# Patient Record
Sex: FEMALE | Race: WHITE | NOT HISPANIC OR LATINO
[De-identification: names, ages, dates, MRNs, and addresses within clinical notes are randomized per-mention and may not be internally consistent; named-entity substitution may affect disease eponyms.]

---

## 2023-01-01 ENCOUNTER — NON-APPOINTMENT (OUTPATIENT)
Age: 73
End: 2023-01-01

## 2023-07-28 PROBLEM — Z00.00 ENCOUNTER FOR PREVENTIVE HEALTH EXAMINATION: Status: ACTIVE | Noted: 2023-07-28

## 2023-08-22 ENCOUNTER — RX ONLY (RX ONLY)
Age: 73
End: 2023-08-22

## 2023-08-22 ENCOUNTER — OFFICE (OUTPATIENT)
Dept: URBAN - METROPOLITAN AREA CLINIC 122 | Facility: CLINIC | Age: 73
Setting detail: OPHTHALMOLOGY
End: 2023-08-22
Payer: MEDICARE

## 2023-08-22 DIAGNOSIS — H02.822: ICD-10-CM

## 2023-08-22 DIAGNOSIS — H43.812: ICD-10-CM

## 2023-08-22 DIAGNOSIS — H40.033: ICD-10-CM

## 2023-08-22 DIAGNOSIS — H16.223: ICD-10-CM

## 2023-08-22 DIAGNOSIS — H25.13: ICD-10-CM

## 2023-08-22 PROCEDURE — 92020 GONIOSCOPY: CPT | Performed by: OPHTHALMOLOGY

## 2023-08-22 PROCEDURE — 99214 OFFICE O/P EST MOD 30 MIN: CPT | Performed by: OPHTHALMOLOGY

## 2023-08-22 ASSESSMENT — SPHEQUIV_DERIVED
OS_SPHEQUIV: 0.125
OD_SPHEQUIV: -0.25
OS_SPHEQUIV: -0.375
OD_SPHEQUIV: -0.875
OS_SPHEQUIV: -0.375
OD_SPHEQUIV: -0.875

## 2023-08-22 ASSESSMENT — REFRACTION_CURRENTRX
OD_OVR_VA: 20/
OD_CYLINDER: -1.25
OS_SPHERE: +1.00
OD_CYLINDER: -1.50
OD_AXIS: 91
OS_AXIS: 96
OS_CYLINDER: -2.00
OD_SPHERE: +2.50
OS_SPHERE: +3.00
OD_AXIS: 97
OD_OVR_VA: 20/
OD_SPHERE: +0.25
OS_OVR_VA: 20/
OS_CYLINDER: -2.00
OS_OVR_VA: 20/
OS_AXIS: 96

## 2023-08-22 ASSESSMENT — CONFRONTATIONAL VISUAL FIELD TEST (CVF)
OD_FINDINGS: FULL
OS_FINDINGS: FULL

## 2023-08-22 ASSESSMENT — REFRACTION_AUTOREFRACTION
OD_AXIS: 98
OD_SPHERE: +0.75
OS_AXIS: 92
OS_CYLINDER: -2.25
OS_SPHERE: +1.25
OD_CYLINDER: -2.00

## 2023-08-22 ASSESSMENT — REFRACTION_MANIFEST
OU_VA: 20/25
OS_AXIS: 5
OS_SPHERE: -1.25
OD_AXIS: 5
OU_VA: 20/25
OD_SPHERE: -1.75
OD_VA1: 20/25
OS_AXIS: 5
OS_CYLINDER: +1.75
OD_CYLINDER: +1.75
OS_SPHERE: -1.25
OD_AXIS: 5
OS_CYLINDER: +1.75
OD_SPHERE: -1.75
OS_VA1: 20/25
OD_CYLINDER: +1.75
OS_VA1: 20/25
OD_VA1: 20/25

## 2023-08-22 ASSESSMENT — TONOMETRY
OS_IOP_MMHG: 14
OD_IOP_MMHG: 14

## 2023-08-22 ASSESSMENT — TEAR BREAK UP TIME (TBUT)
OD_TBUT: 2+
OS_TBUT: 2+

## 2023-08-22 ASSESSMENT — VISUAL ACUITY
OD_BCVA: 20/30
OS_BCVA: 20/40

## 2023-08-23 ENCOUNTER — APPOINTMENT (OUTPATIENT)
Dept: HEART AND VASCULAR | Facility: CLINIC | Age: 73
End: 2023-08-23
Payer: MEDICARE

## 2023-08-23 ENCOUNTER — NON-APPOINTMENT (OUTPATIENT)
Age: 73
End: 2023-08-23

## 2023-08-23 ENCOUNTER — TRANSCRIPTION ENCOUNTER (OUTPATIENT)
Age: 73
End: 2023-08-23

## 2023-08-23 VITALS
OXYGEN SATURATION: 98 % | WEIGHT: 155 LBS | HEART RATE: 60 BPM | HEIGHT: 66.5 IN | DIASTOLIC BLOOD PRESSURE: 68 MMHG | RESPIRATION RATE: 16 BRPM | SYSTOLIC BLOOD PRESSURE: 139 MMHG | BODY MASS INDEX: 24.62 KG/M2 | TEMPERATURE: 97.3 F

## 2023-08-23 DIAGNOSIS — R07.89 OTHER CHEST PAIN: ICD-10-CM

## 2023-08-23 DIAGNOSIS — Z86.39 PERSONAL HISTORY OF OTHER ENDOCRINE, NUTRITIONAL AND METABOLIC DISEASE: ICD-10-CM

## 2023-08-23 DIAGNOSIS — Z78.9 OTHER SPECIFIED HEALTH STATUS: ICD-10-CM

## 2023-08-23 DIAGNOSIS — Z87.891 PERSONAL HISTORY OF NICOTINE DEPENDENCE: ICD-10-CM

## 2023-08-23 PROCEDURE — 99202 OFFICE O/P NEW SF 15 MIN: CPT

## 2023-08-23 PROCEDURE — 93000 ELECTROCARDIOGRAM COMPLETE: CPT

## 2023-08-24 NOTE — ASSESSMENT
[FreeTextEntry1] : 72 yo female with h/o HTN, HLD and extensive FMH of cardiac disease here to establish care for worsening chest tightness and dyspnea on exertion over the past several months  EKG today sinus bradycardia with Twis in III, aVF #chest tightness/dyspnea --prior stress tests were normal per patient --will obtain CCTA to evaluate for CAD --TTE pending --carotid duplex  #Hypertension --BP slightly elevated today; reports increased stress of late and elevated BP during office visits --continue losartan for now; will continue to monitor #Hyperlipidemia --continue crestor 5mg for now --Last LDL 88, will continue to monitor  will f/u after above testing

## 2023-08-24 NOTE — HISTORY OF PRESENT ILLNESS
[FreeTextEntry1] : 74 yo female with h/o HTN, HLD and extensive FMH of cardiac disease here to establish care for worsening chest tightness. Reports symptoms with exertion, especially worse this past winter when she notes that she had episodes during strenuous activities such as walking up a hill. Associated with dyspnea on exertion. Denies any palpitations, LE edema, orthopnea, dizziness/lightheadedness. 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 Reports prior cardiac workup which included stress testing has been negative. Last one about 3-4 years ago (no records available to me) +FMH of premature CAD - father had MIs in his 50s, mother had MI later on in 60-70s  Former smoker; quit 15-16 years ago  Labs 11/2022: --Lipid panel Tchol 174/HDL 66, LDL 88, TG 98 Creat 0.7

## 2023-08-28 ENCOUNTER — NON-APPOINTMENT (OUTPATIENT)
Age: 73
End: 2023-08-28

## 2023-09-12 ENCOUNTER — RESULT REVIEW (OUTPATIENT)
Age: 73
End: 2023-09-12

## 2023-09-12 ENCOUNTER — OFFICE (OUTPATIENT)
Dept: URBAN - METROPOLITAN AREA CLINIC 122 | Facility: CLINIC | Age: 73
Setting detail: OPHTHALMOLOGY
End: 2023-09-12
Payer: MEDICARE

## 2023-09-12 DIAGNOSIS — H02.822: ICD-10-CM

## 2023-09-12 PROCEDURE — 92285 EXTERNAL OCULAR PHOTOGRAPHY: CPT | Performed by: OPHTHALMOLOGY

## 2023-09-12 PROCEDURE — 67840 REMOVE EYELID LESION: CPT | Performed by: OPHTHALMOLOGY

## 2023-09-12 ASSESSMENT — REFRACTION_MANIFEST
OD_AXIS: 5
OS_CYLINDER: +1.75
OS_AXIS: 5
OD_VA1: 20/25
OD_SPHERE: -1.75
OS_AXIS: 5
OS_VA1: 20/25
OS_SPHERE: -1.25
OS_SPHERE: -1.25
OD_CYLINDER: +1.75
OU_VA: 20/25
OU_VA: 20/25
OD_AXIS: 5
OD_CYLINDER: +1.75
OS_CYLINDER: +1.75
OS_VA1: 20/25
OD_VA1: 20/25
OD_SPHERE: -1.75

## 2023-09-12 ASSESSMENT — VISUAL ACUITY
OD_BCVA: 20/30
OS_BCVA: 20/30

## 2023-09-12 ASSESSMENT — REFRACTION_CURRENTRX
OD_AXIS: 91
OS_CYLINDER: -2.00
OD_AXIS: 97
OS_OVR_VA: 20/
OS_OVR_VA: 20/
OS_SPHERE: +3.00
OD_OVR_VA: 20/
OS_CYLINDER: -2.00
OD_SPHERE: +2.50
OS_AXIS: 96
OS_AXIS: 96
OD_CYLINDER: -1.25
OD_OVR_VA: 20/
OS_SPHERE: +1.00
OD_SPHERE: +0.25
OD_CYLINDER: -1.50

## 2023-09-12 ASSESSMENT — SPHEQUIV_DERIVED
OS_SPHEQUIV: 0.125
OS_SPHEQUIV: -0.375
OD_SPHEQUIV: -0.875
OS_SPHEQUIV: -0.375
OD_SPHEQUIV: -0.875
OD_SPHEQUIV: -0.25

## 2023-09-12 ASSESSMENT — REFRACTION_AUTOREFRACTION
OD_SPHERE: +0.75
OS_SPHERE: +1.25
OD_AXIS: 98
OS_AXIS: 92
OS_CYLINDER: -2.25
OD_CYLINDER: -2.00

## 2023-09-12 ASSESSMENT — TONOMETRY
OS_IOP_MMHG: 16
OD_IOP_MMHG: 15

## 2023-09-15 ENCOUNTER — NON-APPOINTMENT (OUTPATIENT)
Age: 73
End: 2023-09-15

## 2023-09-15 LAB
ANION GAP SERPL CALC-SCNC: 9 MMOL/L
BUN SERPL-MCNC: 18 MG/DL
CALCIUM SERPL-MCNC: 9.4 MG/DL
CHLORIDE SERPL-SCNC: 101 MMOL/L
CO2 SERPL-SCNC: 27 MMOL/L
CREAT SERPL-MCNC: 0.77 MG/DL
EGFR: 81 ML/MIN/1.73M2
GLUCOSE SERPL-MCNC: 83 MG/DL
POTASSIUM SERPL-SCNC: 4.3 MMOL/L
SODIUM SERPL-SCNC: 137 MMOL/L

## 2023-09-18 ENCOUNTER — RESULT REVIEW (OUTPATIENT)
Age: 73
End: 2023-09-18

## 2023-09-19 ENCOUNTER — NON-APPOINTMENT (OUTPATIENT)
Age: 73
End: 2023-09-19

## 2023-10-11 ENCOUNTER — APPOINTMENT (OUTPATIENT)
Dept: PULMONOLOGY | Facility: CLINIC | Age: 73
End: 2023-10-11
Payer: MEDICARE

## 2023-10-11 VITALS
SYSTOLIC BLOOD PRESSURE: 130 MMHG | WEIGHT: 156 LBS | DIASTOLIC BLOOD PRESSURE: 74 MMHG | HEART RATE: 60 BPM | HEIGHT: 66.5 IN | BODY MASS INDEX: 24.78 KG/M2 | OXYGEN SATURATION: 98 %

## 2023-10-11 DIAGNOSIS — R93.89 ABNORMAL FINDINGS ON DIAGNOSTIC IMAGING OF OTHER SPECIFIED BODY STRUCTURES: ICD-10-CM

## 2023-10-11 PROCEDURE — 99202 OFFICE O/P NEW SF 15 MIN: CPT

## 2023-10-11 RX ORDER — METOPROLOL SUCCINATE 50 MG/1
50 TABLET, EXTENDED RELEASE ORAL
Qty: 2 | Refills: 0 | Status: DISCONTINUED | COMMUNITY
Start: 2023-08-23 | End: 2023-10-11

## 2023-10-25 ENCOUNTER — NON-APPOINTMENT (OUTPATIENT)
Age: 73
End: 2023-10-25

## 2023-10-25 ENCOUNTER — APPOINTMENT (OUTPATIENT)
Dept: HEART AND VASCULAR | Facility: CLINIC | Age: 73
End: 2023-10-25
Payer: MEDICARE

## 2023-10-25 PROCEDURE — 93306 TTE W/DOPPLER COMPLETE: CPT

## 2023-10-25 PROCEDURE — 93880 EXTRACRANIAL BILAT STUDY: CPT

## 2023-11-06 ENCOUNTER — NON-APPOINTMENT (OUTPATIENT)
Age: 73
End: 2023-11-06

## 2023-11-08 ENCOUNTER — APPOINTMENT (OUTPATIENT)
Dept: PULMONOLOGY | Facility: CLINIC | Age: 73
End: 2023-11-08
Payer: MEDICARE

## 2023-11-08 VITALS
BODY MASS INDEX: 24.46 KG/M2 | HEIGHT: 66.5 IN | OXYGEN SATURATION: 98 % | WEIGHT: 154 LBS | DIASTOLIC BLOOD PRESSURE: 70 MMHG | SYSTOLIC BLOOD PRESSURE: 120 MMHG | HEART RATE: 78 BPM | RESPIRATION RATE: 16 BRPM

## 2023-11-08 DIAGNOSIS — N95.1 MENOPAUSAL AND FEMALE CLIMACTERIC STATES: ICD-10-CM

## 2023-11-08 PROCEDURE — 99213 OFFICE O/P EST LOW 20 MIN: CPT

## 2023-11-08 RX ORDER — ROSUVASTATIN CALCIUM 5 MG/1
5 TABLET, FILM COATED ORAL
Refills: 0 | Status: ACTIVE | COMMUNITY

## 2023-11-08 RX ORDER — LOSARTAN POTASSIUM AND HYDROCHLOROTHIAZIDE 12.5; 1 MG/1; MG/1
100-12.5 TABLET ORAL DAILY
Refills: 0 | Status: ACTIVE | COMMUNITY

## 2023-11-11 ENCOUNTER — NON-APPOINTMENT (OUTPATIENT)
Age: 73
End: 2023-11-11

## 2023-12-29 ENCOUNTER — NON-APPOINTMENT (OUTPATIENT)
Age: 73
End: 2023-12-29

## 2024-09-13 ENCOUNTER — APPOINTMENT (OUTPATIENT)
Dept: ORTHOPEDIC SURGERY | Facility: CLINIC | Age: 74
End: 2024-09-13
Payer: MEDICARE

## 2024-09-13 VITALS — HEIGHT: 66.5 IN | WEIGHT: 154 LBS | BODY MASS INDEX: 24.46 KG/M2

## 2024-09-13 DIAGNOSIS — M16.11 UNILATERAL PRIMARY OSTEOARTHRITIS, RIGHT HIP: ICD-10-CM

## 2024-09-13 PROCEDURE — 99204 OFFICE O/P NEW MOD 45 MIN: CPT | Mod: 57

## 2024-09-13 NOTE — PHYSICAL EXAM
[de-identified] : painful restricted rom right hip nvid nl gait skin intact [de-identified] : hip xrays shows severe arthritis with bone on bone contact, subchondral sclerosis and osteophytes and cysts right

## 2024-09-13 NOTE — HISTORY OF PRESENT ILLNESS
[de-identified] : Patient comes in with more than 6 months of right hip pain atraumatic in onset.  Patient has tried greater than 6 months of nsaids, physical therapy and doctor directed exercises and injections.  Patient continues to have pain that is 8/10 in severity and interferes with activities of daily living such as putting on shoes and socks, walking two blocks, and getting up and down from a chair and toilet.  hip osteoarthritis outcome score is 8.2

## 2024-09-13 NOTE — DISCUSSION/SUMMARY
[de-identified] : This patient has failed non-operative treatments for right hip arthritis and is now indicated for a total hip replacement.  I had a long discussion with the patient regarding the plan, the expected outcome, the risks, benefits, and alternatives of surgery. The risks include, but are not limited to, infection (which may require future surgery and removal of implants) , bleeding (which may require a transfusion), damage to nerves, arteries, veins, tendons, muscles and other adjacent structures, leading to numbness, weakness, continued pain, need for surgical repair, or decreased function. Also discussed the possibilities of dislocation, leg-length discrepancy, intraoperative and post-operative fractures, implant loosening, heterotopic bone formation, and revision for variety of reasons. We also discussed blood clots and other medical complications. This was discussed at length and consent has been obtained  rouse

## 2024-10-25 ENCOUNTER — RESULT REVIEW (OUTPATIENT)
Age: 74
End: 2024-10-25

## 2024-11-05 ENCOUNTER — RESULT REVIEW (OUTPATIENT)
Age: 74
End: 2024-11-05

## 2024-11-05 ENCOUNTER — APPOINTMENT (OUTPATIENT)
Dept: ORTHOPEDIC SURGERY | Facility: HOSPITAL | Age: 74
End: 2024-11-05

## 2024-11-05 ENCOUNTER — TRANSCRIPTION ENCOUNTER (OUTPATIENT)
Age: 74
End: 2024-11-05

## 2024-11-05 PROCEDURE — 27130 TOTAL HIP ARTHROPLASTY: CPT | Mod: RT

## 2024-11-06 ENCOUNTER — TRANSCRIPTION ENCOUNTER (OUTPATIENT)
Age: 74
End: 2024-11-06

## 2024-12-06 ENCOUNTER — APPOINTMENT (OUTPATIENT)
Dept: ORTHOPEDIC SURGERY | Facility: CLINIC | Age: 74
End: 2024-12-06
Payer: MEDICARE

## 2024-12-06 ENCOUNTER — RESULT REVIEW (OUTPATIENT)
Age: 74
End: 2024-12-06

## 2024-12-06 VITALS — BODY MASS INDEX: 24.46 KG/M2 | HEIGHT: 66.5 IN | WEIGHT: 154 LBS

## 2024-12-06 DIAGNOSIS — Z96.641 PRESENCE OF RIGHT ARTIFICIAL HIP JOINT: ICD-10-CM

## 2024-12-06 PROCEDURE — 99024 POSTOP FOLLOW-UP VISIT: CPT

## 2025-01-22 ENCOUNTER — APPOINTMENT (OUTPATIENT)
Dept: ORTHOPEDIC SURGERY | Facility: CLINIC | Age: 75
End: 2025-01-22

## 2025-01-22 VITALS
WEIGHT: 148 LBS | HEART RATE: 85 BPM | BODY MASS INDEX: 23.5 KG/M2 | OXYGEN SATURATION: 98 % | HEIGHT: 66.5 IN | SYSTOLIC BLOOD PRESSURE: 146 MMHG | DIASTOLIC BLOOD PRESSURE: 77 MMHG

## 2025-01-22 DIAGNOSIS — Z96.641 PRESENCE OF RIGHT ARTIFICIAL HIP JOINT: ICD-10-CM

## 2025-01-22 DIAGNOSIS — M70.61 TROCHANTERIC BURSITIS, RIGHT HIP: ICD-10-CM

## 2025-01-22 PROCEDURE — 20610 DRAIN/INJ JOINT/BURSA W/O US: CPT | Mod: 79,RT

## 2025-01-22 PROCEDURE — 99024 POSTOP FOLLOW-UP VISIT: CPT

## 2025-01-22 PROCEDURE — 72170 X-RAY EXAM OF PELVIS: CPT

## 2025-02-26 ENCOUNTER — APPOINTMENT (OUTPATIENT)
Dept: ORTHOPEDIC SURGERY | Facility: CLINIC | Age: 75
End: 2025-02-26
Payer: MEDICARE

## 2025-02-26 DIAGNOSIS — Z96.641 PRESENCE OF RIGHT ARTIFICIAL HIP JOINT: ICD-10-CM

## 2025-02-26 PROCEDURE — 99213 OFFICE O/P EST LOW 20 MIN: CPT

## 2025-03-26 DIAGNOSIS — Z96.641 PRESENCE OF RIGHT ARTIFICIAL HIP JOINT: ICD-10-CM
